# Patient Record
Sex: FEMALE | Race: WHITE | HISPANIC OR LATINO | Employment: UNEMPLOYED | URBAN - METROPOLITAN AREA
[De-identification: names, ages, dates, MRNs, and addresses within clinical notes are randomized per-mention and may not be internally consistent; named-entity substitution may affect disease eponyms.]

---

## 2024-02-29 ENCOUNTER — APPOINTMENT (OUTPATIENT)
Dept: PRIMARY CARE | Facility: CLINIC | Age: 44
End: 2024-02-29
Payer: COMMERCIAL

## 2024-03-26 ENCOUNTER — APPOINTMENT (OUTPATIENT)
Dept: PRIMARY CARE | Facility: CLINIC | Age: 44
End: 2024-03-26
Payer: COMMERCIAL

## 2024-05-23 ENCOUNTER — APPOINTMENT (OUTPATIENT)
Dept: PRIMARY CARE | Facility: CLINIC | Age: 44
End: 2024-05-23
Payer: COMMERCIAL

## 2025-05-27 ENCOUNTER — OFFICE VISIT (OUTPATIENT)
Dept: DERMATOLOGY | Facility: CLINIC | Age: 45
End: 2025-05-27
Payer: COMMERCIAL

## 2025-05-27 DIAGNOSIS — L60.3 NAIL DYSTROPHY: Primary | ICD-10-CM

## 2025-05-27 DIAGNOSIS — L30.1 DYSHIDROSIS: ICD-10-CM

## 2025-05-27 DIAGNOSIS — L30.8 OTHER SPECIFIED DERMATITIS: ICD-10-CM

## 2025-05-27 PROCEDURE — 99204 OFFICE O/P NEW MOD 45 MIN: CPT | Performed by: DERMATOLOGY

## 2025-05-27 RX ORDER — CICLOPIROX 80 MG/ML
SOLUTION TOPICAL DAILY
Qty: 6.6 ML | Refills: 11 | Status: SHIPPED | OUTPATIENT
Start: 2025-05-27

## 2025-05-27 RX ORDER — TRIAMCINOLONE ACETONIDE 1 MG/G
CREAM TOPICAL
Qty: 454 G | Refills: 0 | Status: SHIPPED | OUTPATIENT
Start: 2025-05-27

## 2025-05-27 ASSESSMENT — DERMATOLOGY QUALITY OF LIFE (QOL) ASSESSMENT
RATE HOW BOTHERED YOU ARE BY SYMPTOMS OF YOUR SKIN PROBLEM (EG, ITCHING, STINGING BURNING, HURTING OR SKIN IRRITATION): 5
RATE HOW EMOTIONALLY BOTHERED YOU ARE BY YOUR SKIN PROBLEM (FOR EXAMPLE, WORRY, EMBARRASSMENT, FRUSTRATION): 4
WHAT SINGLE SKIN CONDITION LISTED BELOW IS THE PATIENT ANSWERING THE QUALITY-OF-LIFE ASSESSMENT QUESTIONS ABOUT: NONE OF THE ABOVE
RATE HOW BOTHERED YOU ARE BY SYMPTOMS OF YOUR SKIN PROBLEM (EG, ITCHING, STINGING BURNING, HURTING OR SKIN IRRITATION): 5
RATE HOW BOTHERED YOU ARE BY EFFECTS OF YOUR SKIN PROBLEMS ON YOUR ACTIVITIES (EG, GOING OUT, ACCOMPLISHING WHAT YOU WANT, WORK ACTIVITIES OR YOUR RELATIONSHIPS WITH OTHERS): 3
RATE HOW EMOTIONALLY BOTHERED YOU ARE BY YOUR SKIN PROBLEM (FOR EXAMPLE, WORRY, EMBARRASSMENT, FRUSTRATION): 4
DATE THE QUALITY-OF-LIFE ASSESSMENT WAS COMPLETED: 67352
RATE HOW BOTHERED YOU ARE BY EFFECTS OF YOUR SKIN PROBLEMS ON YOUR ACTIVITIES (EG, GOING OUT, ACCOMPLISHING WHAT YOU WANT, WORK ACTIVITIES OR YOUR RELATIONSHIPS WITH OTHERS): 3
WHAT SINGLE SKIN CONDITION LISTED BELOW IS THE PATIENT ANSWERING THE QUALITY-OF-LIFE ASSESSMENT QUESTIONS ABOUT: NONE OF THE ABOVE

## 2025-05-27 NOTE — PROGRESS NOTES
Subjective     Lizzeth Pizarro is a 44 y.o. female who presents for the following: Rash (New - Area(s) of concern: left hand (4 years), L/R leg (6 months), L/R axallae (1 year, itchy, discomfort, dry, cracked skin, OTC lotion and hydrocortisone.  ) and Nail Problem (New - Area(s) of concern: L/R great toes that has been present for apprx 6 weeks that looks like toe nail fungus).     Intake Questions  Do you have any new or changing Lesions?: (Patient-Rptd) (P) Yes  Where are these new or changing lesion(s) located?: (Patient-Rptd) (P) Legs , between fingers  For patients coming in for a Follow-up Visit:  Have there been any changes in your health since your last visit?: (Patient-Rptd) (P) No  Are you an organ transplant recipient?: (Patient-Rptd) (P) No  Have you had or do you have a Staph Infection?: (Patient-Rptd) (P) No  Have you had or do you have Vacular Disease?: (Patient-Rptd) (P) No  Do you use sunscreen?: (Patient-Rptd) (P) Daily  Do you use a tanning bed?: (Patient-Rptd) (P) No  Are you trying to get pregnant?: (Patient-Rptd) (P) No  Are you on birth control?: (Patient-Rptd) (P) Yes  If on birth control, what type?: (Patient-Rptd) (P) Jackie    Review of Systems:  No other skin or systemic complaints other than what is documented elsewhere in the note.    The following portions of the chart were reviewed this encounter and updated as appropriate:   Tobacco  Allergies  Meds  Problems  Med Hx  Surg Hx  Fam Hx         Skin Cancer History  Biopsy Log Book  No skin cancers from Specimen Tracking.    Additional History      Specialty Problems    None       Objective   Well appearing patient in no apparent distress; mood and affect are within normal limits.    A focused skin examination was performed of the toes, toenails, bilateral anterior legs, left axilla, left hand. All findings within normal limits unless otherwise noted below.    Assessment/Plan   Skin Exam  1. NAIL DYSTROPHY (2)  Left Hallux  Toenail, Right Hallux Toenail  Distal great toe nails with white discoloration and separation of nail plate from nail bed  The various causes reviewed.  May be changes related to nail polish wearing vs. Onychomycosis.    The nature of nail fungal infections were reviewed with the patient today.  Treatments including topical and oral therapy.  Oral therapy is show to be more effective, however some medications used can be toxic to the liver and therefore require monitoring with laboratory tests while on medication.  Prior to treatment nail clipping if uncertain if there is a fungal infection to confirm prior to starting    Patient does not want to do nail clipping as she is unsure of cost from insurance.    Will empirically start topical ciclopirox solution. Advised to avoid nail polish when using topicals and toe nails need 12-18 months to grow out completely so do recommend use for 12 months typically.    If not improving or worsening after 6 months can contact office for nail clipping.   Related Medications  ciclopirox (Penlac) 8 % solution  Apply topically once daily. To affected toe nail(s). Once per week clean the area with rubbing alcohol  2. OTHER SPECIFIED DERMATITIS  Left Flank, Left Lower Leg - Anterior, Left Upper Arm - Posterior, Right Lower Leg - Anterior  Erythematous scaly patches of the left axillary folds, posterior arms  Bilateral legs with excoriated crusted erythematous papules  Eczema or dermatitis is often multifactorial  It is potentially chronic and intermittently flaring condition.  Disruption of barrier leads to pruritus, scratching and changes noted to skin.   Dermatitis treatment goal is to restore the skin barrier. Exacerbations may be due to a variety of causes.     We discussed a gentle skin care regimen including washing with a mild soap without fragrance such as dove for sensitive skin, cetaphil or cerave. Pat dry after washing and then apply a thick moisturizer such as Cerave cream  or cetaphil cream.   When the skin has flared patient to apply triamcinolone 0.1% cream.     Start triamcinolone 0.1% cream today. Patient to apply 2x daily x 2 wks then decrease to 2x/day 2 days per week. Can repeat full 2 week course as often as every 6-8 weeks as needed for flaring. Side effects of topical steroids includes, but is not limited to skin atrophy and dyspigmentation.  Related Medications  triamcinolone (Kenalog) 0.1 % cream  Apply a finger tip amount to each area 2x daily x 2 weeks then 2x daily 2 days per week, repeat full 2 weeks every 6-8 weeks as needed for flaring.  3. DYSHIDROSIS  Left 3rd Finger Tip, Left 3rd Metacarpophalangeal Region, Left 4th Finger Tip, Left 4th Metacarpophalangeal Region, Left Palmar Middle 3rd Finger, Left Palmar Middle 4th Finger, Left Palmar Proximal 3rd Finger, Left Palmar Proximal 4th Finger  Erythematous scaly patches in the inderdigital webspaces and palm  This is a common eczema (dermatitis) seen most frequently on the hand, however may also affect the feet.  This is most frequently due to chronic wet to dry cycles.  The goal of treatment is to restore the skin barrier and decrease inflammation and itching.  Treatments include topical corticosteroid therapy when the skin is red, itchy and flaky.     To prevent severity and frequency of recurrences a gentle skin care regimen should be followed which includes washing with a fragrance-free soap such as Dove for sensitive skin, Cetaphil or Cerave body washes. After rinsing, pat dry and apply a moisturizer such as Cerave, Cetaphil, or Vanicream. Creams are thicker than lotions and often provde better moisturization than lotions.    Start topical triamcinolone 0.1% cream. Patient to apply 2x daily x 2 wks then decrease to 2x/day 2 days per week. Can repeat full 2 week course as often as every 6-8 weeks as needed for flaring. Side effects of topical steroids includes, but is not limited to skin atrophy and  dyspigmentation.

## 2025-05-27 NOTE — Clinical Note
The various causes reviewed.  May be changes related to nail polish wearing vs. Onychomycosis.    The nature of nail fungal infections were reviewed with the patient today.  Treatments including topical and oral therapy.  Oral therapy is show to be more effective, however some medications used can be toxic to the liver and therefore require monitoring with laboratory tests while on medication.  Prior to treatment nail clipping if uncertain if there is a fungal infection to confirm prior to starting    Patient does not want to do nail clipping as she is unsure of cost from insurance.    Will empirically start topical ciclopirox solution. Advised to avoid nail polish when using topicals and toe nails need 12-18 months to grow out completely so do recommend use for 12 months typically.    If not improving or worsening after 6 months can contact office for nail clipping.

## 2025-05-27 NOTE — Clinical Note
This is a common eczema (dermatitis) seen most frequently on the hand, however may also affect the feet.  This is most frequently due to chronic wet to dry cycles.  The goal of treatment is to restore the skin barrier and decrease inflammation and itching.  Treatments include topical corticosteroid therapy when the skin is red, itchy and flaky.     To prevent severity and frequency of recurrences a gentle skin care regimen should be followed which includes washing with a fragrance-free soap such as Dove for sensitive skin, Cetaphil or Cerave body washes. After rinsing, pat dry and apply a moisturizer such as Cerave, Cetaphil, or Vanicream. Creams are thicker than lotions and often provde better moisturization than lotions.    Start topical triamcinolone 0.1% cream. Patient to apply 2x daily x 2 wks then decrease to 2x/day 2 days per week. Can repeat full 2 week course as often as every 6-8 weeks as needed for flaring. Side effects of topical steroids includes, but is not limited to skin atrophy and dyspigmentation.

## 2025-05-27 NOTE — Clinical Note
Erythematous scaly patches of the left axillary folds, posterior arms  Bilateral legs with excoriated crusted erythematous papules

## 2025-05-27 NOTE — Clinical Note
Eczema or dermatitis is often multifactorial  It is potentially chronic and intermittently flaring condition.  Disruption of barrier leads to pruritus, scratching and changes noted to skin.   Dermatitis treatment goal is to restore the skin barrier. Exacerbations may be due to a variety of causes.     We discussed a gentle skin care regimen including washing with a mild soap without fragrance such as dove for sensitive skin, cetaphil or cerave. Pat dry after washing and then apply a thick moisturizer such as Cerave cream or cetaphil cream.   When the skin has flared patient to apply triamcinolone 0.1% cream.     Start triamcinolone 0.1% cream today. Patient to apply 2x daily x 2 wks then decrease to 2x/day 2 days per week. Can repeat full 2 week course as often as every 6-8 weeks as needed for flaring. Side effects of topical steroids includes, but is not limited to skin atrophy and dyspigmentation.

## 2025-06-10 ENCOUNTER — APPOINTMENT (OUTPATIENT)
Dept: DERMATOLOGY | Facility: CLINIC | Age: 45
End: 2025-06-10
Payer: COMMERCIAL

## 2025-08-26 DIAGNOSIS — L60.3 NAIL DYSTROPHY: ICD-10-CM

## 2025-10-24 ENCOUNTER — APPOINTMENT (OUTPATIENT)
Dept: DERMATOLOGY | Facility: CLINIC | Age: 45
End: 2025-10-24
Payer: COMMERCIAL